# Patient Record
Sex: FEMALE | Race: WHITE | Employment: UNEMPLOYED | URBAN - METROPOLITAN AREA
[De-identification: names, ages, dates, MRNs, and addresses within clinical notes are randomized per-mention and may not be internally consistent; named-entity substitution may affect disease eponyms.]

---

## 2017-01-03 ENCOUNTER — HOSPITAL ENCOUNTER (OUTPATIENT)
Dept: PHYSICAL THERAPY | Age: 22
Discharge: HOME | End: 2017-01-03
Admitting: ORTHOPAEDIC SURGERY

## 2017-01-03 ENCOUNTER — OFFICE VISIT (OUTPATIENT)
Dept: ORTHOPEDIC SURGERY | Age: 22
End: 2017-01-03

## 2017-01-03 VITALS
HEART RATE: 77 BPM | WEIGHT: 149.03 LBS | BODY MASS INDEX: 25.44 KG/M2 | DIASTOLIC BLOOD PRESSURE: 72 MMHG | SYSTOLIC BLOOD PRESSURE: 128 MMHG | HEIGHT: 64 IN

## 2017-01-03 DIAGNOSIS — S83.241D OTHER TEAR OF MEDIAL MENISCUS OF RIGHT KNEE, UNSPECIFIED WHETHER OLD OR CURRENT TEAR, SUBSEQUENT ENCOUNTER: Primary | ICD-10-CM

## 2017-01-03 DIAGNOSIS — Z98.890 S/P ARTHROSCOPIC SURGERY OF RIGHT KNEE: ICD-10-CM

## 2017-01-03 PROCEDURE — 99024 POSTOP FOLLOW-UP VISIT: CPT | Performed by: ORTHOPAEDIC SURGERY

## 2017-01-05 ENCOUNTER — HOSPITAL ENCOUNTER (OUTPATIENT)
Dept: PHYSICAL THERAPY | Age: 22
Discharge: HOME | End: 2017-01-05
Admitting: ORTHOPAEDIC SURGERY

## 2017-01-13 ENCOUNTER — HOSPITAL ENCOUNTER (OUTPATIENT)
Dept: PHYSICAL THERAPY | Age: 22
Discharge: HOME | End: 2017-01-13
Admitting: ORTHOPAEDIC SURGERY

## 2017-02-08 ENCOUNTER — OFFICE VISIT (OUTPATIENT)
Dept: ORTHOPEDIC SURGERY | Age: 22
End: 2017-02-08

## 2017-02-08 VITALS
HEIGHT: 64 IN | BODY MASS INDEX: 24.75 KG/M2 | WEIGHT: 145 LBS | SYSTOLIC BLOOD PRESSURE: 122 MMHG | HEART RATE: 68 BPM | DIASTOLIC BLOOD PRESSURE: 64 MMHG

## 2017-02-08 DIAGNOSIS — S83.231D COMPLEX TEAR OF MEDIAL MENISCUS OF RIGHT KNEE AS CURRENT INJURY, SUBSEQUENT ENCOUNTER: Primary | ICD-10-CM

## 2017-02-08 DIAGNOSIS — Z98.890 STATUS POST MEDIAL MENISCUS REPAIR: ICD-10-CM

## 2017-02-08 PROCEDURE — 99213 OFFICE O/P EST LOW 20 MIN: CPT | Performed by: ORTHOPAEDIC SURGERY

## 2017-04-05 ENCOUNTER — OFFICE VISIT (OUTPATIENT)
Dept: ORTHOPEDIC SURGERY | Age: 22
End: 2017-04-05

## 2017-04-05 VITALS — HEART RATE: 81 BPM | SYSTOLIC BLOOD PRESSURE: 121 MMHG | DIASTOLIC BLOOD PRESSURE: 75 MMHG

## 2017-04-05 DIAGNOSIS — S83.241D OTHER TEAR OF MEDIAL MENISCUS OF RIGHT KNEE, UNSPECIFIED WHETHER OLD OR CURRENT TEAR, SUBSEQUENT ENCOUNTER: Primary | ICD-10-CM

## 2017-04-05 PROCEDURE — 99213 OFFICE O/P EST LOW 20 MIN: CPT | Performed by: ORTHOPAEDIC SURGERY

## 2017-04-06 ENCOUNTER — HOSPITAL ENCOUNTER (OUTPATIENT)
Dept: PHYSICAL THERAPY | Age: 22
Discharge: HOME | End: 2017-04-06
Admitting: ORTHOPAEDIC SURGERY

## 2017-05-09 ENCOUNTER — HOSPITAL ENCOUNTER (OUTPATIENT)
Dept: PHYSICAL THERAPY | Age: 22
Discharge: HOME | End: 2017-05-09
Admitting: ORTHOPAEDIC SURGERY

## 2017-05-18 ENCOUNTER — HOSPITAL ENCOUNTER (OUTPATIENT)
Dept: PHYSICAL THERAPY | Age: 22
Discharge: HOME | End: 2017-05-18
Admitting: ORTHOPAEDIC SURGERY

## 2017-05-25 ENCOUNTER — HOSPITAL ENCOUNTER (OUTPATIENT)
Dept: PHYSICAL THERAPY | Age: 22
Discharge: HOME | End: 2017-05-25
Admitting: ORTHOPAEDIC SURGERY

## 2017-05-30 ENCOUNTER — HOSPITAL ENCOUNTER (OUTPATIENT)
Dept: PHYSICAL THERAPY | Age: 22
Discharge: HOME | End: 2017-05-30
Admitting: ORTHOPAEDIC SURGERY

## 2017-06-08 ENCOUNTER — HOSPITAL ENCOUNTER (OUTPATIENT)
Dept: PHYSICAL THERAPY | Age: 22
Discharge: HOME | End: 2017-06-08
Admitting: ORTHOPAEDIC SURGERY

## 2017-06-13 ENCOUNTER — HOSPITAL ENCOUNTER (OUTPATIENT)
Dept: PHYSICAL THERAPY | Age: 22
Discharge: HOME | End: 2017-06-13
Admitting: ORTHOPAEDIC SURGERY

## 2017-06-15 ENCOUNTER — HOSPITAL ENCOUNTER (OUTPATIENT)
Dept: PHYSICAL THERAPY | Age: 22
Discharge: HOME | End: 2017-06-15
Admitting: ORTHOPAEDIC SURGERY

## 2017-06-27 ENCOUNTER — OFFICE VISIT (OUTPATIENT)
Dept: ORTHOPEDIC SURGERY | Age: 22
End: 2017-06-27

## 2017-06-27 ENCOUNTER — HOSPITAL ENCOUNTER (OUTPATIENT)
Dept: PHYSICAL THERAPY | Age: 22
Discharge: HOME | End: 2017-06-27
Admitting: ORTHOPAEDIC SURGERY

## 2017-06-27 VITALS
SYSTOLIC BLOOD PRESSURE: 119 MMHG | BODY MASS INDEX: 21.03 KG/M2 | HEART RATE: 90 BPM | WEIGHT: 142 LBS | HEIGHT: 69 IN | DIASTOLIC BLOOD PRESSURE: 73 MMHG

## 2017-06-27 DIAGNOSIS — S83.241D OTHER TEAR OF MEDIAL MENISCUS OF RIGHT KNEE, UNSPECIFIED WHETHER OLD OR CURRENT TEAR, SUBSEQUENT ENCOUNTER: Primary | ICD-10-CM

## 2017-06-27 PROCEDURE — 99213 OFFICE O/P EST LOW 20 MIN: CPT | Performed by: ORTHOPAEDIC SURGERY

## 2018-09-12 ENCOUNTER — OFFICE VISIT (OUTPATIENT)
Dept: ORTHOPEDIC SURGERY | Age: 23
End: 2018-09-12

## 2018-09-12 VITALS
WEIGHT: 145 LBS | HEART RATE: 66 BPM | DIASTOLIC BLOOD PRESSURE: 65 MMHG | BODY MASS INDEX: 24.75 KG/M2 | HEIGHT: 64 IN | SYSTOLIC BLOOD PRESSURE: 111 MMHG

## 2018-09-12 DIAGNOSIS — S83.241A TEAR OF MEDIAL MENISCUS OF RIGHT KNEE, CURRENT, UNSPECIFIED TEAR TYPE, INITIAL ENCOUNTER: Primary | ICD-10-CM

## 2018-09-12 DIAGNOSIS — M25.561 RIGHT KNEE PAIN, UNSPECIFIED CHRONICITY: ICD-10-CM

## 2018-09-12 DIAGNOSIS — S83.411A SPRAIN OF MEDIAL COLLATERAL LIGAMENT OF RIGHT KNEE, INITIAL ENCOUNTER: ICD-10-CM

## 2018-09-12 PROCEDURE — 99214 OFFICE O/P EST MOD 30 MIN: CPT | Performed by: ORTHOPAEDIC SURGERY

## 2018-09-12 NOTE — LETTER
[facility]  Surgery Precert & Billing Form:    DEMOGRAPHICS:                                                                                                       Patient Name:  Ugo Lanier  Patient :  1995   Patient SS#:      Patient Phone:  [phone] (home) [phone] (work) Alt.  Patient Phone:    Patient Address:  [address] [place] [address]    PCP:  Connie Zapata MD  Insurance: [employer]    DIAGNOSIS & PROCEDURE:                                                                                      Diagnosis: MMT  Operation: right    SURGERY  INFORMATION  Date of Surgery:   2018  Location:    [facility]  Type:    Outpatient  23 hour hold:  No  Surgeon:          Mansi Springer MD         18     BILLING INFORMATION:                                                                                                Physician Procedure                                            CPT Codes                      [place], or Fellow Procedure                                      CPT Codes                      Precert information:

## 2018-09-12 NOTE — LETTER
[place] [address]  Phone: [phone]  Fax: [phone]    Kandace Vasquez MD        September 12, 2018     Patient: Smitha De La Rosa   YOB: 1995   Date of Visit: 9/12/2018       To Whom It May Concern: It is my medical opinion that Smitha De La Rosa requires a disability parking placard for the following reasons:  She cannot walk 200 feet without stopping to rest.  Duration of need: 6 months    If you have any questions or concerns, please don't hesitate to call.     Sincerely,        Kandace Vasquez MD

## 2018-09-12 NOTE — PROGRESS NOTES
Patient comes to the office for an evaluation of medial sided right knee pain. She reports that Friday she felt a pop in her knee and was seen by a physician who then diagnosed her with a medial meniscus tear and cleared her to play soccer. She notes that yesterday she was playing soccer and her knee locked up on her. Patient states that she had a hard time trying to straighten her knee and trainers tried moving her knee around to try and loosen it. She reports swelling. She is currently using crutches and a knee immobilizer. Outside mri films and report reviewed in the office today. X-rays taken in the office and shows no evidence of a fracture. Of note, patient is 1.5 years following a medial meniscus repair and synovectomy. She rates her pain 5/10 today.      - medial mensicus tear / mcl sprain, right knee     - right knee scope with medial menisectomy

## 2018-09-13 ENCOUNTER — TELEPHONE (OUTPATIENT)
Dept: ORTHOPEDIC SURGERY | Age: 23
End: 2018-09-13

## 2018-09-14 NOTE — PROGRESS NOTES
Chief complaint is right knee pain. The patient is a 25-year-old college  who underwent an ACL reconstruction 3 years ago and a medial meniscus repair a year and a half ago. Last week she was playing and she reinjured her knee and was subsequently diagnosed as having a torn meniscus is allowed to continue playing and develop a locking episode which she says it took several minutes for her athletic trainers were able to straighten her knee out. She comes in today for evaluation. He complains of pain over the medial aspect of her knee. She is using crutches and a knee immobilizer. She says her knee is stiff and tight she has difficulty bending. Pain Assessment  Location of Pain: Knee  Location Modifiers: Right  Quality of Pain: Sharp  Duration of Pain: Persistent  Frequency of Pain: Constant  Aggravating Factors:  (extension and flexion)  Limiting Behavior: Yes  Result of Injury: Yes  Work-Related Injury: No  Are there other pain locations you wish to document?: No    No past medical history on file. Past Surgical History:   Procedure Laterality Date    ANTERIOR CRUCIATE LIGAMENT REPAIR Right 08/05/2015    ARM SURGERY      KNEE ARTHROSCOPY Right 11/09/2016    right knee scope, medial meniscus repair and synovectomy    KNEE ARTHROSCOPY  08/05/2015    acl repair        No family history on file.     Social History     Social History    Marital status: Single     Spouse name: N/A    Number of children: N/A    Years of education: N/A     Social History Main Topics    Smoking status: Never Smoker    Smokeless tobacco: Never Used    Alcohol use 0.0 oz/week    Drug use: No    Sexual activity: Not Asked     Other Topics Concern    None     Social History Narrative    None       Current Outpatient Prescriptions   Medication Sig Dispense Refill    azithromycin (ZITHROMAX) 250 MG tablet       oxyCODONE-acetaminophen (PERCOCET) 5-325 MG per tablet       VESTURA 3-0.02 MG per tablet end of season. Greater than 50% of the visit was spent counseling the patient. I personally reviewed the patient's pain scale, review of systems, family history, social history, past medical history, allergies and medications. 13 point review of systems was collected today and is included in the medical record. Duke Wallace MD  Sports Medicine, Knee and Shoulder Surgery    This dictation was performed with a verbal recognition program [employer]) and it was checked for errors. It is possible that there are still dictated errors within this office note. If so, please bring any errors to my attention for an addendum. All efforts were made to ensure that this office note is accurate.

## 2018-09-18 RX ORDER — DROSPIRENONE AND ETHINYL ESTRADIOL 0.02-3(28)
1 KIT ORAL DAILY
COMMUNITY

## 2018-09-19 ENCOUNTER — ANESTHESIA EVENT (OUTPATIENT)
Dept: OPERATING ROOM | Age: 23
End: 2018-09-19
Payer: COMMERCIAL

## 2018-09-19 RX ORDER — PROMETHAZINE HYDROCHLORIDE 25 MG/1
25 TABLET ORAL EVERY 6 HOURS PRN
Qty: 20 TABLET | Refills: 0 | Status: SHIPPED | OUTPATIENT
Start: 2018-09-19 | End: 2018-09-26

## 2018-09-20 ENCOUNTER — HOSPITAL ENCOUNTER (OUTPATIENT)
Age: 23
Setting detail: OUTPATIENT SURGERY
Discharge: HOME | End: 2018-09-20
Attending: ORTHOPAEDIC SURGERY | Admitting: ORTHOPAEDIC SURGERY
Payer: COMMERCIAL

## 2018-09-20 ENCOUNTER — ANESTHESIA (OUTPATIENT)
Dept: OPERATING ROOM | Age: 23
End: 2018-09-20
Payer: COMMERCIAL

## 2018-09-20 VITALS
DIASTOLIC BLOOD PRESSURE: 83 MMHG | TEMPERATURE: 98 F | OXYGEN SATURATION: 100 % | BODY MASS INDEX: 24.16 KG/M2 | RESPIRATION RATE: 14 BRPM | WEIGHT: 145 LBS | HEIGHT: 65 IN | SYSTOLIC BLOOD PRESSURE: 137 MMHG | HEART RATE: 54 BPM

## 2018-09-20 VITALS
OXYGEN SATURATION: 74 % | SYSTOLIC BLOOD PRESSURE: 91 MMHG | DIASTOLIC BLOOD PRESSURE: 43 MMHG | RESPIRATION RATE: 18 BRPM

## 2018-09-20 LAB — PREGNANCY, URINE: NEGATIVE

## 2018-09-20 PROCEDURE — 6360000002 HC RX W HCPCS: Performed by: ORTHOPAEDIC SURGERY

## 2018-09-20 PROCEDURE — 6360000002 HC RX W HCPCS: Performed by: ANESTHESIOLOGY

## 2018-09-20 PROCEDURE — 2500000003 HC RX 250 WO HCPCS: Performed by: NURSE ANESTHETIST, CERTIFIED REGISTERED

## 2018-09-20 PROCEDURE — 3600000014 HC SURGERY LEVEL 4 ADDTL 15MIN: Performed by: ORTHOPAEDIC SURGERY

## 2018-09-20 PROCEDURE — 6360000002 HC RX W HCPCS: Performed by: NURSE ANESTHETIST, CERTIFIED REGISTERED

## 2018-09-20 PROCEDURE — 3700000000 HC ANESTHESIA ATTENDED CARE: Performed by: ORTHOPAEDIC SURGERY

## 2018-09-20 PROCEDURE — 2720000010 HC SURG SUPPLY STERILE: Performed by: ORTHOPAEDIC SURGERY

## 2018-09-20 PROCEDURE — 7100000010 HC PHASE II RECOVERY - FIRST 15 MIN: Performed by: ORTHOPAEDIC SURGERY

## 2018-09-20 PROCEDURE — 7100000001 HC PACU RECOVERY - ADDTL 15 MIN: Performed by: ORTHOPAEDIC SURGERY

## 2018-09-20 PROCEDURE — 2580000003 HC RX 258: Performed by: ANESTHESIOLOGY

## 2018-09-20 PROCEDURE — 2580000003 HC RX 258: Performed by: ORTHOPAEDIC SURGERY

## 2018-09-20 PROCEDURE — 2709999900 HC NON-CHARGEABLE SUPPLY: Performed by: ORTHOPAEDIC SURGERY

## 2018-09-20 PROCEDURE — 7100000011 HC PHASE II RECOVERY - ADDTL 15 MIN: Performed by: ORTHOPAEDIC SURGERY

## 2018-09-20 PROCEDURE — 3600000004 HC SURGERY LEVEL 4 BASE: Performed by: ORTHOPAEDIC SURGERY

## 2018-09-20 PROCEDURE — 6370000000 HC RX 637 (ALT 250 FOR IP): Performed by: ANESTHESIOLOGY

## 2018-09-20 PROCEDURE — 3700000001 HC ADD 15 MINUTES (ANESTHESIA): Performed by: ORTHOPAEDIC SURGERY

## 2018-09-20 PROCEDURE — 84703 CHORIONIC GONADOTROPIN ASSAY: CPT

## 2018-09-20 PROCEDURE — 7100000000 HC PACU RECOVERY - FIRST 15 MIN: Performed by: ORTHOPAEDIC SURGERY

## 2018-09-20 RX ORDER — SCOLOPAMINE TRANSDERMAL SYSTEM 1 MG/1
1 PATCH, EXTENDED RELEASE TRANSDERMAL
Status: DISCONTINUED | OUTPATIENT
Start: 2018-09-20 | End: 2018-09-20 | Stop reason: HOSPADM

## 2018-09-20 RX ORDER — LIDOCAINE HYDROCHLORIDE 20 MG/ML
INJECTION, SOLUTION EPIDURAL; INFILTRATION; INTRACAUDAL; PERINEURAL PRN
Status: DISCONTINUED | OUTPATIENT
Start: 2018-09-20 | End: 2018-09-20 | Stop reason: SDUPTHER

## 2018-09-20 RX ORDER — DEXAMETHASONE SODIUM PHOSPHATE 4 MG/ML
INJECTION, SOLUTION INTRA-ARTICULAR; INTRALESIONAL; INTRAMUSCULAR; INTRAVENOUS; SOFT TISSUE PRN
Status: DISCONTINUED | OUTPATIENT
Start: 2018-09-20 | End: 2018-09-20 | Stop reason: SDUPTHER

## 2018-09-20 RX ORDER — FENTANYL CITRATE 50 UG/ML
50 INJECTION, SOLUTION INTRAMUSCULAR; INTRAVENOUS EVERY 5 MIN PRN
Status: DISCONTINUED | OUTPATIENT
Start: 2018-09-20 | End: 2018-09-20 | Stop reason: HOSPADM

## 2018-09-20 RX ORDER — FENTANYL CITRATE 50 UG/ML
25 INJECTION, SOLUTION INTRAMUSCULAR; INTRAVENOUS EVERY 5 MIN PRN
Status: DISCONTINUED | OUTPATIENT
Start: 2018-09-20 | End: 2018-09-20 | Stop reason: HOSPADM

## 2018-09-20 RX ORDER — MIDAZOLAM HYDROCHLORIDE 1 MG/ML
INJECTION INTRAMUSCULAR; INTRAVENOUS PRN
Status: DISCONTINUED | OUTPATIENT
Start: 2018-09-20 | End: 2018-09-20 | Stop reason: SDUPTHER

## 2018-09-20 RX ORDER — ROPIVACAINE HYDROCHLORIDE 5 MG/ML
INJECTION, SOLUTION EPIDURAL; INFILTRATION; PERINEURAL PRN
Status: DISCONTINUED | OUTPATIENT
Start: 2018-09-20 | End: 2018-09-20 | Stop reason: HOSPADM

## 2018-09-20 RX ORDER — AMOXICILLIN 250 MG
2 CAPSULE ORAL DAILY PRN
Qty: 30 TABLET | Refills: 0 | COMMUNITY
Start: 2018-09-20

## 2018-09-20 RX ORDER — PROPOFOL 10 MG/ML
INJECTION, EMULSION INTRAVENOUS PRN
Status: DISCONTINUED | OUTPATIENT
Start: 2018-09-20 | End: 2018-09-20 | Stop reason: SDUPTHER

## 2018-09-20 RX ORDER — PROMETHAZINE HYDROCHLORIDE 25 MG/ML
6.25 INJECTION, SOLUTION INTRAMUSCULAR; INTRAVENOUS
Status: COMPLETED | OUTPATIENT
Start: 2018-09-20 | End: 2018-09-20

## 2018-09-20 RX ORDER — SODIUM CHLORIDE, SODIUM LACTATE, POTASSIUM CHLORIDE, CALCIUM CHLORIDE 600; 310; 30; 20 MG/100ML; MG/100ML; MG/100ML; MG/100ML
INJECTION, SOLUTION INTRAVENOUS CONTINUOUS
Status: DISCONTINUED | OUTPATIENT
Start: 2018-09-20 | End: 2018-09-20 | Stop reason: HOSPADM

## 2018-09-20 RX ORDER — KETOROLAC TROMETHAMINE 30 MG/ML
INJECTION, SOLUTION INTRAMUSCULAR; INTRAVENOUS
Status: DISCONTINUED
Start: 2018-09-20 | End: 2018-09-20 | Stop reason: HOSPADM

## 2018-09-20 RX ORDER — KETOROLAC TROMETHAMINE 30 MG/ML
30 INJECTION, SOLUTION INTRAMUSCULAR; INTRAVENOUS ONCE
Status: COMPLETED | OUTPATIENT
Start: 2018-09-20 | End: 2018-09-20

## 2018-09-20 RX ORDER — ONDANSETRON 2 MG/ML
4 INJECTION INTRAMUSCULAR; INTRAVENOUS
Status: COMPLETED | OUTPATIENT
Start: 2018-09-20 | End: 2018-09-20

## 2018-09-20 RX ORDER — SODIUM CHLORIDE, SODIUM LACTATE, POTASSIUM CHLORIDE, AND CALCIUM CHLORIDE .6; .31; .03; .02 G/100ML; G/100ML; G/100ML; G/100ML
IRRIGANT IRRIGATION PRN
Status: DISCONTINUED | OUTPATIENT
Start: 2018-09-20 | End: 2018-09-20 | Stop reason: HOSPADM

## 2018-09-20 RX ORDER — SCOLOPAMINE TRANSDERMAL SYSTEM 1 MG/1
PATCH, EXTENDED RELEASE TRANSDERMAL
Status: DISCONTINUED
Start: 2018-09-20 | End: 2018-09-20 | Stop reason: HOSPADM

## 2018-09-20 RX ORDER — OXYCODONE HYDROCHLORIDE AND ACETAMINOPHEN 5; 325 MG/1; MG/1
1 TABLET ORAL EVERY 4 HOURS PRN
Status: DISCONTINUED | OUTPATIENT
Start: 2018-09-20 | End: 2018-09-20 | Stop reason: HOSPADM

## 2018-09-20 RX ORDER — FENTANYL CITRATE 50 UG/ML
INJECTION, SOLUTION INTRAMUSCULAR; INTRAVENOUS PRN
Status: DISCONTINUED | OUTPATIENT
Start: 2018-09-20 | End: 2018-09-20 | Stop reason: SDUPTHER

## 2018-09-20 RX ADMIN — SODIUM CHLORIDE, SODIUM LACTATE, POTASSIUM CHLORIDE, AND CALCIUM CHLORIDE: 600; 310; 30; 20 INJECTION, SOLUTION INTRAVENOUS at 08:35

## 2018-09-20 RX ADMIN — KETOROLAC TROMETHAMINE 30 MG: 30 INJECTION, SOLUTION INTRAMUSCULAR at 09:50

## 2018-09-20 RX ADMIN — SODIUM CHLORIDE, SODIUM LACTATE, POTASSIUM CHLORIDE, AND CALCIUM CHLORIDE: 600; 310; 30; 20 INJECTION, SOLUTION INTRAVENOUS at 07:30

## 2018-09-20 RX ADMIN — PROPOFOL 220 MG: 10 INJECTION, EMULSION INTRAVENOUS at 07:41

## 2018-09-20 RX ADMIN — ONDANSETRON 4 MG: 2 INJECTION INTRAMUSCULAR; INTRAVENOUS at 08:30

## 2018-09-20 RX ADMIN — Medication 2 G: at 07:51

## 2018-09-20 RX ADMIN — FENTANYL CITRATE 50 MCG: 50 INJECTION INTRAMUSCULAR; INTRAVENOUS at 08:09

## 2018-09-20 RX ADMIN — HYDROMORPHONE HYDROCHLORIDE 0.25 MG: 1 INJECTION, SOLUTION INTRAMUSCULAR; INTRAVENOUS; SUBCUTANEOUS at 09:10

## 2018-09-20 RX ADMIN — PROMETHAZINE HYDROCHLORIDE 6.25 MG: 25 INJECTION INTRAMUSCULAR; INTRAVENOUS at 09:11

## 2018-09-20 RX ADMIN — HYDROMORPHONE HYDROCHLORIDE 0.25 MG: 1 INJECTION, SOLUTION INTRAMUSCULAR; INTRAVENOUS; SUBCUTANEOUS at 09:44

## 2018-09-20 RX ADMIN — MIDAZOLAM HYDROCHLORIDE 2 MG: 1 INJECTION INTRAMUSCULAR; INTRAVENOUS at 07:30

## 2018-09-20 RX ADMIN — LIDOCAINE HYDROCHLORIDE 60 MG: 20 INJECTION, SOLUTION EPIDURAL; INFILTRATION; INTRACAUDAL; PERINEURAL at 07:41

## 2018-09-20 RX ADMIN — DEXAMETHASONE SODIUM PHOSPHATE 4 MG: 4 INJECTION, SOLUTION INTRAMUSCULAR; INTRAVENOUS at 08:30

## 2018-09-20 ASSESSMENT — PULMONARY FUNCTION TESTS
PIF_VALUE: 9
PIF_VALUE: 8
PIF_VALUE: 9
PIF_VALUE: 0
PIF_VALUE: 9
PIF_VALUE: 1
PIF_VALUE: 9
PIF_VALUE: 8
PIF_VALUE: 1
PIF_VALUE: 9
PIF_VALUE: 1
PIF_VALUE: 9
PIF_VALUE: 1
PIF_VALUE: 9
PIF_VALUE: 1
PIF_VALUE: 0
PIF_VALUE: 2
PIF_VALUE: 9

## 2018-09-20 ASSESSMENT — PAIN DESCRIPTION - DESCRIPTORS
DESCRIPTORS: ACHING

## 2018-09-20 ASSESSMENT — PAIN DESCRIPTION - ORIENTATION
ORIENTATION: RIGHT

## 2018-09-20 ASSESSMENT — PAIN DESCRIPTION - PAIN TYPE
TYPE: SURGICAL PAIN

## 2018-09-20 ASSESSMENT — PAIN DESCRIPTION - LOCATION
LOCATION: KNEE
LOCATION: HIP;KNEE
LOCATION: KNEE

## 2018-09-20 ASSESSMENT — PAIN SCALES - GENERAL
PAINLEVEL_OUTOF10: 5
PAINLEVEL_OUTOF10: 4
PAINLEVEL_OUTOF10: 4
PAINLEVEL_OUTOF10: 5
PAINLEVEL_OUTOF10: 4

## 2018-09-20 ASSESSMENT — PAIN DESCRIPTION - FREQUENCY: FREQUENCY: CONTINUOUS

## 2018-09-20 ASSESSMENT — PAIN - FUNCTIONAL ASSESSMENT: PAIN_FUNCTIONAL_ASSESSMENT: 0-10

## 2018-09-20 NOTE — PROGRESS NOTES
Ambulatory Surgery/Procedure Discharge Note    Vitals:    09/20/18 1006   BP: 137/83   Pulse: 54   Resp: 14   Temp: 98 °F (36.7 °C)   SpO2: 100%       In: 1521 [I.V.:1521]  Out: 15     Pain assessment:  {Pain assessment:  Pain Level: 4      Arrived in SDS will speak if spoken to. No nausea. Sleepy. Parents with her. Encouraged to sleep. Clean right knee dressing with fresh ice bags. Right dorsalis palp. Toes are pink with instant refill. Moving toes and denies numbness or tingling in foot. Parents are stepping out to let her sleep for 45 min more then to try to DC. Because of dizziness and history of severe NV. Parents to obtain RX from pharmacy. 1120 Awakened and she asked to void. Up to BR . Steady when up. No nausea and no pain now. Family called for. Discharge instructions reviewed. Patient and parents educated, using the teach back method, about follow up instructions and discharge instructions. A completed copy of the AVS instructions given to patient and all questions answered. Patient discharged to home/self care.  Patient discharged via wheel chair by RN transporter to waiting family/S.O.       9/20/2018 10:09 AM
CLINICAL PHARMACY NOTE: MEDS TO [address] Select Patient?: No  Total # of Prescriptions Filled: 1   The following medications were delivered to the patient:  · Percocet  Total # of Interventions Completed: 0  Time Spent (min): 15    Additional Documentation:
Color was pale on arrival.now is no longer pale
PACU Transfer to [facility]    Vitals:    09/20/18 0950   BP: 134/78   Pulse: 61   Resp: 15   Temp: 97.9 °F (36.6 °C)   SpO2: 99%         Intake/Output Summary (Last 24 hours) at 09/20/18 0956  Last data filed at 09/20/18 0840   Gross per 24 hour   Intake             1000 ml   Output               15 ml   Net              985 ml       Pain assessment:  Pt had nausea last surgery 2 years ago, scope patch behind right ear, phenergan given in pacu. Pt eating a few crackers and sips of soda. Nausea improved after phenergan. Toradol given in pacu, pain level 4, \"tolerable\" to pt.   Father previously updated  Pain Level: 4    Patient transferred to care of BRENDON RN.    9/20/2018 9:56 AM
Pt arrived from OR, s/p RIGHT KNEE ARTHROSCOPY, MEDIAL MENISCUS EXCISION, SYNOVECTOMY (Right ), oral airway removed on arrival.  Scope patch behind right ear
Pt c/o slight nausea, treated with IV phenergan
you have not been preregistered yet. On the day of your procedure bring your insurance card and photo ID. You will be registered at your bedside once brought back to your room. 5. DO NOT EAT OR DRINK ANYTHING AFTER MIDNIGHT. 6. MEDICATIONS    Take the following medications with a SMALL sip of water:    Use your usual dose of inhalers the morning of surgery. BRING your rescue inhaler with you to hospital.    Anesthesia does NOT want you to take insulin the morning of surgery. They will control your blood sugar while you are at the hospital. Please contact your ordering physician for instructions regarding your insulin the night before your procedure. If you have an insulin pump, please keep it set on basal rate. 7. Do not swallow water when brushing teeth. No gum, candy, mints or ice chips. Refrain from smoking or at least decrease the amount. 8. Dress in loose, comfortable clothing appropriate for redressing after your procedure. Do not wear jewelry (including body piercings), make-up (especially NO eye make-up), fingernail polish (NO toenail polish if foot/leg surgery), lotion, powders or metal hairclips. 9. Dentures, glasses, or contacts will need to be removed before your procedure. Bring cases for your glasses, contacts, dentures, or hearing aids to protect them while you are in surgery. 10. If you use a CPAP, please bring it with you on the day of your procedure. 11. We recommend that valuable personal  belongings, such as credit cards, cash, cell phones, e-tablets or jewelry, be left at home during your stay. The hospital will not be responsible for valuables that are not secured in the hospital safe. However, if your insurance requires a co-pay, you may want to bring a method of payment, i.e. Check or credit card, if you wish to pay your co-pay the day of surgery. 12. If you are to stay overnight, you may bring a bag with personal items.  Please have any large items you may

## 2018-09-20 NOTE — ANESTHESIA POSTPROCEDURE EVALUATION
Department of Anesthesiology  Postprocedure Note    Patient: Gerry Barr  MRN: [id number]  [place]: 1995  Date of evaluation: 9/20/2018  Time:  1:05 PM     Procedure Summary     Date:  09/20/18 Room / Location:  [facility] OR [address] [place] [address] OR    Anesthesia Start:  0730 Anesthesia Stop:  6138    Procedure:  RIGHT KNEE ARTHROSCOPY, MEDIAL MENISCUS EXCISION, SYNOVECTOMY (Right ) Diagnosis:  (MEDIAL MENSICUS TEAR RIGHT KNEE)    Surgeon:  Katie Schulz MD Responsible Provider:  Joanna Liz MD    Anesthesia Type:  general ASA Status:  1          Anesthesia Type: general    Tarik Phase I: Tarik Score: 10    Tarik Phase II: Tarik Score: 9    Last vitals: Reviewed and per EMR flowsheets.        Anesthesia Post Evaluation    Patient location during evaluation: PACU  Patient participation: complete - patient participated  Level of consciousness: awake and alert  Airway patency: patent  Nausea & Vomiting: no nausea and no vomiting  Complications: no  Cardiovascular status: blood pressure returned to baseline  Respiratory status: acceptable  Hydration status: stable

## 2018-09-20 NOTE — ANESTHESIA PRE PROCEDURE
Department of Anesthesiology  Preprocedure Note       Name:  Jay Ford   Age:  25 y.o.  :  1995                                          MRN:  [id number]         Date:  2018      Surgeon: Gregorio Clark): Everett Garcia MD    Procedure: Procedure(s):  RIGHT KNEE ARTHROSCOPY, MEDIAL MENISCUS EXCISION VS REPAIR    Medications prior to admission:   Prior to Admission medications    Medication Sig Start Date End Date Taking?  Authorizing Provider   drospirenone-ethinyl estradiol (LORYNA) 3-0.02 MG per tablet Take 1 tablet by mouth daily   Yes Historical Provider, MD   promethazine (PHENERGAN) 25 MG tablet Take 1 tablet by mouth every 6 hours as needed for Nausea 18  Everett Garcia MD   [facility]) 250 MG tablet  11/15/15   Historical Provider, MD   oxyCODONE-acetaminophen (PERCOCET) 5-325 MG per tablet  8/5/15   Historical Provider, MD       Current medications:    Current Facility-Administered Medications   Medication Dose Route Frequency Provider Last Rate Last Dose    ceFAZolin (ANCEF) 2 g in dextrose 5 % 50 mL IVPB  2 g Intravenous Once Everett Garcia MD        lactated ringers infusion   Intravenous Continuous Natacha Lord MD        scopolamine (TRANSDERM-SCOP) transdermal patch 1 patch  1 patch Transdermal Q72H Marysol Peng MD           Allergies:  No Known Allergies    Problem List:    Patient Active Problem List   Diagnosis Code    ACL tear, right S83.519A    Medial meniscus tear, right S83.249A    Knee MCL sprain, right S83.419A    Acne L70.9    Tear of lateral cartilage or meniscus of knee, current S83.289A    Lateral meniscus tear S83.289A    S/P ACL repair A61.326    S/P arthroscopic surgery of right knee Z98.890    Complex tear of medial meniscus of right knee as current injury S80.80A    S/P knee surgery Z98.890    Status post medial meniscus repair P55.022       Past Medical History:        Diagnosis Date    PONV (postoperative nausea

## 2018-09-20 NOTE — H&P
Angelita Montanez    [id number]    [facility] Same Day Surgery Update H & P  Department of General Surgery   Surgical Service   CNP Pre-operative History and Physical  Last H & P within the last 30 days. DIAGNOSIS:   MEDIAL MENSICUS TEAR RIGHT KNEE    PROCEDURE:  Right Knee Arthroscopy, Medial Meniscus Excision Vs Repair    HISTORY OF PRESENT ILLNESS:  A 25year old female patient with history of right knee pain, stiffness, limited ROM, inability to achieve and maintain FWB status. Symptoms not relieved by conservative treatment. Patient here today for surgical intervention. Please see initial H & P     Past Medical History:        Diagnosis Date    PONV (postoperative nausea and vomiting)      Past Surgical History:        Procedure Laterality Date    ANTERIOR CRUCIATE LIGAMENT REPAIR Right 08/05/2015    ARM SURGERY      KNEE ARTHROSCOPY Right 11/09/2016    right knee scope, medial meniscus repair and synovectomy    KNEE ARTHROSCOPY  08/05/2015    acl repair      Past Social History:  Social History     Social History    Marital status: Single     Spouse name: N/A    Number of children: N/A    Years of education: N/A     Social History Main Topics    Smoking status: Never Smoker    Smokeless tobacco: Never Used    Alcohol use 0.0 oz/week      Comment: occasional    Drug use: No    Sexual activity: Not Asked     Other Topics Concern    None     Social History Narrative    None         Medications Prior to Admission:      Prior to Admission medications    Medication Sig Start Date End Date Taking?  Authorizing Provider   drospirenone-ethinyl estradiol (LORYNA) 3-0.02 MG per tablet Take 1 tablet by mouth daily   Yes Historical Provider, MD   promethazine (PHENERGAN) 25 MG tablet Take 1 tablet by mouth every 6 hours as needed for Nausea 9/19/18 9/26/18  Ramon Ackerman MD   azithromycin (ZITHROMAX) 250 MG tablet  11/15/15   Historical Provider, MD   oxyCODONE-acetaminophen (PERCOCET) 5-325 MG per tablet  8/5/15   Historical Provider, MD         Allergies:  Patient has no known allergies. PHYSICAL EXAM:      /72   Pulse 76   Temp 98 °F (36.7 °C) (Oral)   Resp 16   Ht 5' 5\" (1.651 m)   Wt 145 lb (65.8 kg)   LMP 09/13/2018 (Exact Date)   SpO2 97%   BMI 24.13 kg/m²      Heart:  regular rate and rhythm, no murmur noted on exam    Lungs:  No increased work of breathing, good air exchange, clear to auscultation bilaterally, no crackles or wheezing    Abdomen:  soft, non-distended, non-tender, no rebound tenderness or guarding, normal active bowel sounds, no masses palpated and no hepatosplenomegaly    ASSESSMENT AND PLAN:    1. Patient seen and focused exam done today- no new changes since last physical exam on 9/17/2018    2. Access to ancillary services are available per request of the provider.     JEFF Henriquez - CNP     9/20/2018

## 2018-09-20 NOTE — OP NOTE
Knee was visualized sequentially. Articular cartilage surfaces showed evidence of some very mild fraying of  the medial facet of the patella as well as a central trochlea, and  chondroplasty of this area was carried out in which the articular cartilage  was smoothed down to a stable base of tissue. Reactive synovitis was  excised medially, laterally and anteriorly. A three-compartment  synovectomy was carried out using synovial shaver. The lateral meniscus  appeared normal.  ACL graft was intact. The patient did have a displaced  bucket-handle medial meniscus tear and the meniscus appeared quite  degenerative. It was not amenable to repair. Partial medial meniscectomy  was carried out by first detaching the posterior aspect of the meniscus,  flipping the fragment into the anterior aspect of the knee and then  excising it back to stable base of tissue. This resulted in a near  complete meniscectomy involving the posterior horn of the medial meniscus. Anterior horn was left intact, mid body about 50% resected. Articular  cartilage surfaces appeared normal in the medial compartment. No loose  bodies were identified. Meniscal tibial recess was evaluated. No loose  bodies were present or displaced fragments were present. The scope was  removed from the joint. Incision was closed with Monocryl and dressing was  applied. The patient was awakened and taken to recovery room in stable  condition.   The sponge and needle counts were correct at the conclusion of  procedure and blood loss was minimal.        Ghada Jones MD    D: 09/20/2018 8:45:40       T: 09/20/2018 8:47:14     [email or web address]  Job#: [id number]     Doc#: [id number]    CC:

## 2018-09-21 ENCOUNTER — OFFICE VISIT (OUTPATIENT)
Dept: ORTHOPEDIC SURGERY | Age: 23
End: 2018-09-21

## 2018-09-21 ENCOUNTER — HOSPITAL ENCOUNTER (OUTPATIENT)
Dept: PHYSICAL THERAPY | Age: 23
Setting detail: THERAPIES SERIES
Discharge: HOME | End: 2018-09-21
Payer: COMMERCIAL

## 2018-09-21 VITALS
SYSTOLIC BLOOD PRESSURE: 113 MMHG | BODY MASS INDEX: 24.16 KG/M2 | HEART RATE: 69 BPM | HEIGHT: 65 IN | DIASTOLIC BLOOD PRESSURE: 73 MMHG | WEIGHT: 145 LBS

## 2018-09-21 DIAGNOSIS — M65.9 SYNOVITIS: ICD-10-CM

## 2018-09-21 DIAGNOSIS — S83.241D TEAR OF MEDIAL MENISCUS OF RIGHT KNEE, CURRENT, UNSPECIFIED TEAR TYPE, SUBSEQUENT ENCOUNTER: Primary | ICD-10-CM

## 2018-09-21 DIAGNOSIS — Z98.890 S/P MEDIAL MENISCECTOMY OF RIGHT KNEE: ICD-10-CM

## 2018-09-21 PROCEDURE — G8979 MOBILITY GOAL STATUS: HCPCS

## 2018-09-21 PROCEDURE — 97110 THERAPEUTIC EXERCISES: CPT

## 2018-09-21 PROCEDURE — 97016 VASOPNEUMATIC DEVICE THERAPY: CPT

## 2018-09-21 PROCEDURE — 99024 POSTOP FOLLOW-UP VISIT: CPT | Performed by: ORTHOPAEDIC SURGERY

## 2018-09-21 PROCEDURE — G8978 MOBILITY CURRENT STATUS: HCPCS

## 2018-09-21 PROCEDURE — 97161 PT EVAL LOW COMPLEX 20 MIN: CPT

## 2018-09-21 NOTE — LETTER
Weight Bearing: [] Non  [] 1/4  [] 1/2  [] 3/4  [] Full  ROM: [] Restricted  [] Full  [] Follow established:        [] Other:

## 2018-09-21 NOTE — PROGRESS NOTES
Patient comes in the office 1 day following a right partial medial menisectomy, chondroplasty and synovectomy. She reports that she is doing well and is not having any issues. She states that she is managing her pain well and was able to sleep ok.      - doing well postoperatively    - begin rehab and follow up 2 weeks
tablet       oxyCODONE-acetaminophen (PERCOCET) 5-325 MG per tablet        No current facility-administered medications for this visit. No Known Allergies    Vital signs:  /73   Pulse 69   Ht 5' 5\" (1.651 m)   Wt 145 lb (65.8 kg)   LMP 09/13/2018 (Exact Date)   BMI 24.13 kg/m²        Neuro: Alert & oriented x 3,  normal,  no focal deficits noted. Normal affect. Eyes: sclera clear  Ears: Normal external ear  Mouth:  No perioral lesions  Pulm: Respirations unlabored and regular  Pulse: Regular rate and rhythm   Skin: Warm, well perfused          Knee exam benign. Minimal effusion. Calf soft nontender. Impression: Stable postop. Begin rehab with follow-up in 2 weeks. Greater than 50% of the visit was spent counseling the patient. I personally reviewed the patient's pain scale, review of systems, family history, social history, past medical history, allergies and medications. 13 point review of systems was collected today and is included in the medical record. Khai Doss MD  Sports Medicine, Knee and Shoulder Surgery    This dictation was performed with a verbal recognition program [facility]) and it was checked for errors. It is possible that there are still dictated errors within this office note. If so, please bring any errors to my attention for an addendum. All efforts were made to ensure that this office note is accurate.

## 2018-09-21 NOTE — PLAN OF CARE
The [address] [place] [address]  Phone [phone]  Fax [phone]                                                       Physical Therapy Certification    Dear Referring Practitioner: Shakeel Oneal,    We had the pleasure of evaluating the following patient for physical therapy services at [address] [place] [address]. A summary of our findings can be found in the initial assessment below. This includes our plan of care. If you have any questions or concerns regarding these findings, please do not hesitate to contact me at the office phone number checked above. Thank you for the referral.       Physician Signature:_______________________________Date:__________________  By signing above (or electronic signature), therapists plan is approved by physician      Patient: John Morocho   : 1995   MRN: [id number]  Referring Physician: Referring Practitioner: Shakeel Oneal      Evaluation Date: 2018      Medical Diagnosis Information:  Diagnosis: P23.085L (ICD-10-CM) - Tear of medial meniscus of right knee, current, unspecified tear type, subsequent encounter Z98.890 (ICD-10-CM) - S/P medial meniscectomy of right knee M65.9 (ICD-10-CM) - Synovitis   Treatment Diagnosis: L knee pain, impaired ROM, impaired strength, impaired gait                                         Insurance information: PT Insurance Information: [facility] [place] - 40 visits per year     Precautions/ Contra-indications: none  Latex Allergy:  [x]NO      []YES  Preferred Language for Healthcare:   [x]English       []other:    SUBJECTIVE: Patient stated complaint: Pt is a 22 yof 1 day s/p Right knee arthroscopy with partial medial meniscectomy, patellar chondroplasty and synovectomy (DOS18). 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 She has hx of R ACLR and R medial meniscus repair.  Was previously indep with all mobility and ADLs. Goal is to get back to PLOF of playing college soccer. Pt electing to return to school and work with team PT. Relevant Medical History:R ACLR 15', R medial meniscus repair 16'  Functional Disability Index:PT G-Codes  Functional Assessment Tool Used: LEFS  Score: 33.75% impaired  Functional Limitation: Mobility: Walking and moving around  Mobility: Walking and Moving Around Current Status ():  At least 20 percent but less than 40 percent impaired, limited or restricted  Mobility: Walking and Moving Around Goal Status (): 0 percent impaired, limited or restricted    Pain Scale: 0/10  Easing factors: rest, ice  Provocative factors: weight bearing, movement     Type: []Constant   [x]Intermittent  []Radiating []Localized []other:     Numbness/Tingling: denies     Occupation/School: [employer]     Living Status/Prior Level of Function: Independent with ADLs and IADLs, college     OBJECTIVE:      Flexibility L R Comment   Hamstring  Mod restriction    Gastroc  Mod restriction    ITB  Deferred     Quad  Deferred            ROM PROM AROM Overpressure Comment    L R L R L R    Flexion 10 hyper 5 hyper        Extension 135 115                                Strength L R Comment   Quad  Iso: Good contraction    Hamstring  deferred    Gastroc  deferred    Hip  flexion  deferred    Hip abd  deferred                    Special Test Results/Comment   Meniscal Click    Crepitus    Flexion Test    Valgus Laxity    Varus Laxity    Lachmans    Drop Back    Homans -           Girth L R   Mid Patella 34 cm 35 cm   Suprapatellar     5cm above     15cm above       Reflexes/Sensation:    []Dermatomes/Myotomes intact deferred   []Reflexes equal and normal bilaterally deferred    []Other:    Joint mobility: deferred   []Normal    []Hypo   []Hyper    Palpation: ttp around incisions    Functional Mobility/Transfers:all upright and mobility tranfers impaired d/t functional outcome. [x] EVAL (LOW) 26810 (typically 20 minutes face-to-face)  [] EVAL (MOD) 90134 (typically 30 minutes face-to-face)  [] EVAL (HIGH) 88571 (typically 45 minutes face-to-face)  [] RE-EVAL       PLAN  Frequency/Duration:  1-2 days per week for 4-6 Weeks:  Interventions:  [x]  Therapeutic exercise including: strength training, ROM, for Lower extremity and core   [x]  NMR activation and proprioception for LE, Glutes and Core   [x]  Manual therapy as indicated for LE, Hip and spine to include: Dry Needling/IASTM, STM, PROM, Gr I-IV mobilizations, manipulation. [x] Modalities as needed that may include: thermal agents, E-stim, Biofeedback, US, iontophoresis as indicated  [x] Patient education on joint protection, postural re-education, activity modification, progression of HEP. HEP instruction: (see scanned forms)    GOALS:  Patient stated goal: soccer    Therapist goals for Patient:   Short Term Goals: To be achieved in: 2 weeks  1. Independent in HEP and progression per patient tolerance, in order to prevent re-injury. 2. Patient will have a decrease in pain to facilitate improvement in movement, function, and ADLs as indicated by Functional Deficits. Long Term Goals: To be achieved in:  4-6 weeks  1. Disability index score of 0% or less for the LEFS to assist with reaching prior level of function. 2. Patient will demonstrate increased AROM to 10 hyper to 135d to allow for proper joint functioning as indicated by patients Functional Deficits. 3. Patient will demonstrate an increase in Strength to 5/5 proximal hip strength and control in LE to allow for proper functional mobility as indicated by patients Functional Deficits. 4. Patient will return to  functional activities without increased symptoms or restriction.    5. Playing soccer     Electronically signed by:  Russ Landau, PT

## 2018-09-21 NOTE — FLOWSHEET NOTE
The [facility] and [facility], [address] [place] [address] [address] [facility], [address] [place] [address]  Phone: [phone]   Fax:     [phone]    Physical Therapy Daily Treatment Note  Date:  2018    Patient Name:  Vielka Olivia    :  1995  MRN: [id number]  Restrictions/Precautions:    Medical/Treatment Diagnosis Information:  Diagnosis: D35.015V (ICD-10-CM) - Tear of medial meniscus of right knee, current, unspecified tear type, subsequent encounter Z98.890 (ICD-10-CM) - S/P medial meniscectomy of right knee M65.9 (ICD-10-CM) - Synovitis  Treatment Diagnosis: L knee pain, impaired ROM, impaired strength, impaired gait  Insurance/Certification information:  PT Insurance Information: [facility] [place] - 40 visits per year  Physician Information:  Referring Practitioner: Shantanu Rosenthal  Plan of care signed (Y/N):     Date of Patient follow up with Physician:     G-Code (if applicable):      Date G-Code Applied:    PT G-Codes  Functional Assessment Tool Used: LEFS  Score: 33.75% impaired  Functional Limitation: Mobility: Walking and moving around  Mobility: Walking and Moving Around Current Status (): At least 20 percent but less than 40 percent impaired, limited or restricted  Mobility: Walking and Moving Around Goal Status (): 0 percent impaired, limited or restricted    Progress Note: [x]  Yes  []  No  Next due by: Visit #10       Latex Allergy:  [x]NO      []YES  Preferred Language for Healthcare:   [x]English       []other:    Visit # Insurance Allowable   1 40     Pain level: 0/10     SUBJECTIVE:  See eval    OBJECTIVE: See eval  Observation:   Test measurements:      RESTRICTIONS/PRECAUTIONS: WBAT    Exercises/Interventions:   Flexibility/Mobility     Ankle Pumps x30    Seated Self Extension Mob     Calf Stretch 1. Open Chain - 5x30\"  2. Incline Board -  3. Wall -     Heel Slides 10x10\"    Quad Stretch 1. Prone w/ Belt -   2. Standing -   3.  EOT - Hamstring - EOT 5x30\"    Supine Hang     Exercise/NMR     Supine Quad Sets 15x5\"    Seated Quad Set (Bug Squish)     SLRs 1. Flexion - 3x10  2. ABd - 3x10  3. ADd - 3x10  4. Ext -   5. SLR Hold -     Clamshells     Bridges 1. DL -   2. [address] -   3. SL Eccentric -   4. SL -     Knee Ext - EOT     Knee Flex - Standing     Wall Sits     Step Ups 1. Fwd -   2. Lat -     Lateral Step Downs     Lateral Toe Taps     Resisted Lateral Walking     Squat     Lunges 1. Fwd -  2. Diagonal -   3. Lateral -     Ham Curls on Stability Ball     SL RDL     Balance - Tandem 1. EO, ground -   2. EC, ground -   3. EO, airex -   4. EC, airex -     Balance - SLS 1. EO, ground -   2. EC, ground -   3. EO, airex -   4. EC, airex -     Leg Press 1. DL -   2. SL -     Quantum Quad Ext 1. DL -   2. SL -     Quantum Ham Curl 1. DL -   2. SL -     Manual Treatment     Patellar Mob 1. Sup -   2. Inf -   3. Medial -     Knee Ext Mob     Tibial IR      Flex w/ Tibial IR                          Therapeutic Exercise and NMR EXR  [x] (16119) Provided verbal/tactile cueing for activities related to strengthening, flexibility, endurance, ROM for improvements in LE, proximal hip, and core control with self care, mobility, lifting, ambulation.  [] (48998) Provided verbal/tactile cueing for activities related to improving balance, coordination, kinesthetic sense, posture, motor skill, proprioception  to assist with LE, proximal hip, and core control in self care, mobility, lifting, ambulation and eccentric single leg control.      NMR and Therapeutic Activities:    [] (69344 or 52073) Provided verbal/tactile cueing for activities related to improving balance, coordination, kinesthetic sense, posture, motor skill, proprioception and motor activation to allow for proper function of core, proximal hip and LE with self care and ADLs  [] (77178) Gait Re-education- Provided training and instruction to the patient for proper LE, core and proximal hip

## 2018-10-02 ENCOUNTER — OFFICE VISIT (OUTPATIENT)
Dept: ORTHOPEDIC SURGERY | Age: 23
End: 2018-10-02

## 2018-10-02 VITALS
DIASTOLIC BLOOD PRESSURE: 76 MMHG | SYSTOLIC BLOOD PRESSURE: 119 MMHG | WEIGHT: 145 LBS | HEIGHT: 64 IN | BODY MASS INDEX: 24.75 KG/M2 | HEART RATE: 70 BPM

## 2018-10-02 DIAGNOSIS — Z98.890 S/P MEDIAL MENISCECTOMY OF RIGHT KNEE: Primary | ICD-10-CM

## 2018-10-02 PROCEDURE — 99024 POSTOP FOLLOW-UP VISIT: CPT | Performed by: ORTHOPAEDIC SURGERY

## 2018-10-02 NOTE — PROGRESS NOTES
Chief Complaint  Follow-up (right knee. s/p 12 days medial meniscectomy. doing well )      History of Present Illness:  Marie Narvaez is a pleasant 25 y.o. female 12 days status post right knee medial menisectomy, patellar chondroplasty, and synovectomy on 9/20/2018. She continue to do well without concerns. Her trainers are telling her she's ready to go back to soccer. She reports that she's back to about 1/2 of her normal activities with significant swelling and pain. On Saturday she did non contact practice. Medical History:  Patient's medications, allergies, past medical, surgical, social and family histories were reviewed and updated as appropriate. Pertinent items are noted in HPI  Review of systems reviewed from Patient History Form dated on 10/2/2018 and available in the patient's chart under the Media tab. Vital Signs:  Vitals:    10/02/18 1537   BP: 119/76   Pulse: 70         Neuro: Alert & oriented x 3,  normal,  no focal deficits noted. Normal affect. Eyes: sclera clear  Ears: Normal external ear  Mouth:  No perioral lesions  Pulm: Respirations unlabored and regular  Pulse: Regular rate and rhythm   Skin: Warm, well perfused      Constitutional: In no apparent distress. Normal affect. Alert and oriented X3 and is cooperative. RIGHT Knee Examination:    Gait: No use of assistive devices. No antalgic gait. Alignment: Alignment appreciated. Inspection/skin: Surgical incisions healing well. No indication of infection. No dehiscence. No diffuse erythema. Quadriceps well developed. Skin is intact without erythema or ecchymosis. No gross deformity. Palpation: No crepitus. Nontender along joint line. No pain with compression of patella. Nontender to light touch. Range of Motion: Full ROM. Strength: 5/5 quad strength    Effusion: No apparent effusion. Ligamentous stability: No gross instability. Patella tracking: Smooth translation of patella.      Special tests:

## 2018-10-05 NOTE — PROGRESS NOTES
Patella apprehension sign negative. Neurologic and vascular: Skin is warm and well-perfused. Distally neurovascularly intact. Additional findings: Calf soft nontender. Sensation is intact to light-touch. No pretibial edema. Comparison LEFT Knee Examination:    Gait: No use of assistive devices. No antalgic gait. Alignment: Alignment appreciated. Inspection/skin: Quadriceps well developed. Skin is intact without erythema or ecchymosis. No gross deformity. Palpation: No crepitus. Nontender along joint line. No pain with compression of patella. Nontender to light touch. Range of Motion: Full ROM. Strength: 5/5 quad strength    Effusion: No apparent effusion. Ligamentous stability: No gross instability. Patella tracking: Smooth translation of patella. Special tests: Patella apprehension sign negative. Neurologic and vascular: Skin is warm and well-perfused. Distally neurovascularly intact. Additional findings: Calf soft nontender. Sensation is intact to light-touch. No pretibial edema. Radiology:     No new imaging obtained today. Assessment : 21yo female 12 days status post right knee medial menisectomy, patellar chondroplasty, and synovectomy on 9/20/2018. Impression:  Encounter Diagnosis   Name Primary?  S/P medial meniscectomy of right knee Yes       Office Procedures:  No orders of the defined types were placed in this encounter. Plan: Continue activities as tolerated and using her brace with activity. Pain and swelling will be her guide. Followup as needed. Steffi Mcdonough is in agreement with this plan. All questions were answered to patient's satisfaction and was encouraged to call with any further questions. Meeta [address], PA-C  10/5/2018       During this examination, I, Meeta [address], PA-C, functioned as a scribe for Dr. Alesia Farnsworth. The history taking and physical examination were performed by Dr. Alesia Farnsworth.   All counseling during the appointment was performed between the patient and Dr. Neo Blackburn. 10/5/2018 8:59 AM      This dictation was performed with a verbal recognition program (DRAGON) and it was checked for errors. It is possible that there are still dictated errors within this office note. If so, please bring any errors to my attention for an addendum. All efforts were made to ensure that this office note is accurate. Greater than 50% of the visit was spent counseling the patient. I personally reviewed the patient's pain scale, review of systems, family history, social history, past medical history, allergies and medications. 13 point review of systems was collected and reviewed today. It is noncontributory. I personally performed the services described in this documentation and scribed by [address] [place] [address] LUCIE. David Larios MD  Sports Medicine, Arthroscopic Knee and Shoulder Surgery    This dictation was performed with a verbal recognition program [facility]) and it was checked for errors. It is possible that there are still dictated errors within this office note. If so, please bring any errors to my attention for an addendum. All efforts were made to ensure that this office note is accurate.

## 2021-07-30 ENCOUNTER — OFFICE VISIT (OUTPATIENT)
Dept: ORTHOPEDIC SURGERY | Age: 26
End: 2021-07-30
Payer: COMMERCIAL

## 2021-07-30 VITALS — BODY MASS INDEX: 23.05 KG/M2 | WEIGHT: 135 LBS | TEMPERATURE: 97.1 F | HEIGHT: 64 IN

## 2021-07-30 DIAGNOSIS — M25.561 RIGHT KNEE PAIN, UNSPECIFIED CHRONICITY: Primary | ICD-10-CM

## 2021-07-30 DIAGNOSIS — M22.41 CHONDROMALACIA OF PATELLOFEMORAL JOINT, RIGHT: ICD-10-CM

## 2021-07-30 PROCEDURE — 99214 OFFICE O/P EST MOD 30 MIN: CPT | Performed by: ORTHOPAEDIC SURGERY

## 2021-07-30 NOTE — PROGRESS NOTES
Date:  2021    Name:  Jeanette Cullen  Address:  [address] [place] [address] [place] [address]    :  1995      Age:   26 y.o.    SSN:        Medical Record Number:  <[id number]>    Reason for Visit:    Chief Complaint    Joint Pain (old patient new problem increase right knee pain)      DOS:2021     HPI: Jeanette Cullen is a 26 y.o. female here today for evaluation of right knee pain that has been ongoing for a couple weeks. She does not recall an associated injury but developed pain while on a walk at the beginning of July. She complains of anterior pressure and medial sided pain exacerbated with exercise. Endorses cracking and popping. Denies catching or locking. She has a history of right knee medial menisectomy and ACL reconstruction. She has not had any formal treatment for this current issue. Pain Assessment  Location of Pain: Knee  Location Modifiers: Right  Severity of Pain: 2  Quality of Pain: Sharp, Aching  Frequency of Pain: Intermittent  Aggravating Factors: Exercise  Limiting Behavior: Some  Result of Injury: Yes  Work-Related Injury: No  Are there other pain locations you wish to document?: No  ROS: Review of systems reviewed from Patient History Form completed today and available in the patient's chart under the Media tab. Past Medical History:   Diagnosis Date    PONV (postoperative nausea and vomiting)     Synovitis 2018        Past Surgical History:   Procedure Laterality Date    ANTERIOR CRUCIATE LIGAMENT REPAIR Right 2015    ARM SURGERY      KNEE ARTHROSCOPY Right 2016    right knee scope, medial meniscus repair and synovectomy    KNEE ARTHROSCOPY  2015    acl repair     SC ARTHRS KNE SURG W/MENISCECTOMY MED/LAT W/SHVG Right 2018    RIGHT KNEE ARTHROSCOPY, MEDIAL MENISCUS EXCISION, SYNOVECTOMY performed by Jonnathan Sun MD at [facility] reviewed. No pertinent family history.     Social History Socioeconomic History    Marital status: Single     Spouse name: None    Number of children: None    Years of education: None    Highest education level: None   Occupational History    Occupation:    Tobacco Use    Smoking status: Never Smoker    Smokeless tobacco: Never Used   Substance and Sexual Activity    Alcohol use: Yes     Alcohol/week: 0.0 standard drinks     Comment: occasional    Drug use: No    Sexual activity: None   Other Topics Concern    None   Social History Narrative    None     Social Determinants of Health     Financial Resource Strain:     Difficulty of Paying Living Expenses:    Food Insecurity:     Worried About Running Out of Food in the Last Year:     Ran Out of Food in the Last Year:    Transportation Needs:     Lack of Transportation (Medical):  Lack of Transportation (Non-Medical):    Physical Activity:     Days of Exercise per Week:     Minutes of Exercise per Session:    Stress:     Feeling of Stress :    Social Connections:     Frequency of Communication with Friends and Family:     Frequency of Social Gatherings with Friends and Family:     Attends Judaism Services:     Active Member of Clubs or Organizations:     Attends Club or Organization Meetings:     Marital Status:    Intimate Partner Violence:     Fear of Current or Ex-Partner:     Emotionally Abused:     Physically Abused:     Sexually Abused:        Current Outpatient Medications   Medication Sig Dispense Refill    senna-docusate (PERICOLACE) 8.6-50 MG per tablet Take 2 tablets by mouth daily as needed for Constipation 30 tablet 0    drospirenone-ethinyl estradiol (LORYNA) 3-0.02 MG per tablet Take 1 tablet by mouth daily      azithromycin (ZITHROMAX) 250 MG tablet       oxyCODONE-acetaminophen (PERCOCET) 5-325 MG per tablet        No current facility-administered medications for this visit.        No Known Allergies    Vital signs:  Temp 97.1 °F (36.2 °C)   Ht 5' 4\" (1.626 m)   Wt 135 lb (61.2 kg)   BMI 23.17 kg/m²          RIGHT knee exam    Gait: No use of assistive devices. No antalgic gait. Alignment: normal alignment. Inspection/skin: Skin is intact without erythema or ecchymosis. No gross deformity. Palpation: Pain with compression of the patella. Lateral retinacular tightness and tenderness to palpation. Mild retropatellar crepitus. Mild medial joint line tenderness. Tenderness is present over medial parapatellar plica. no joint line tenderness present. Range of Motion: There is full range of motion. Hamstring and IT band tightness. Strength: Normal quadriceps development. Effusion: Small effusion. No bursal swelling. Ligamentous stability: No cruciate or collateral ligament instability. Neurologic and vascular: Skin is warm and well-perfused. Sensation is intact to light-touch. Distally neurovascularly intact. Special tests: Patella apprehension sign is negative. Negative Asuncion sign. LEFT Knee Exam:    Gait: No use of assistive devices. No antalgic gait. Alignment: Normal alignment. Inspection/skin: Skin is intact without erythema or ecchymosis. No gross deformity. Palpation: No crepitus. No joint line tenderness present. Range of Motion: There is full range of motion. Strength: Normal quadriceps development. Effusion: No effusion or swelling present. Ligamentous stability: No cruciate or collateral ligament instability. Neurologic and vascular: Skin is warm and well-perfused. Sensation is intact to light-touch. Special tests: Negative Asuncion sign. Diagnostics:  Radiology:     Pertinent imaging was interpreted and reviewed with the patient. Radiographs were obtained, interpreted, and reviewed with the patient in the office; 4 views: bilateral PA, bilateral To[place] Abby, bilateral Merchants AND right lateral    Impression: Mild patellofemoral and medial joint space narrowing.

## (undated) DEVICE — GLOVE SURG SZ 9 L1185IN FNGR THK75MIL STRW LTX POLYMER BEAD

## (undated) DEVICE — SOLUTION IRRIG 3000ML LAC R FLX CONT

## (undated) DEVICE — PACK PROCEDURE SURG ARTHROSCOPY

## (undated) DEVICE — PAD DRY FLOOR ABS 32X58IN GRN

## (undated) DEVICE — TUBING PMP L6FT CONT WAVE EXTN

## (undated) DEVICE — GOWN,SIRUS,POLYRNF,SETINSLV,XL,20/CS: Brand: MEDLINE

## (undated) DEVICE — COVER,MAYO STAND,XL,STERILE: Brand: MEDLINE

## (undated) DEVICE — 3M™ STERI-DRAPE™ U-DRAPE 1015: Brand: STERI-DRAPE™

## (undated) DEVICE — SKIN AFFIX SURG ADHESIVE 72/CS 0.55ML: Brand: MEDLINE

## (undated) DEVICE — BLADE SHV L13CM DIA4MM EXCALIBUR AGG COOLCUT

## (undated) DEVICE — TURNOVER KIT RM INF CTRL TECH

## (undated) DEVICE — GOWN,SIRUS,POLYRNF,BRTHSLV,XLN/XXL,18/CS: Brand: MEDLINE

## (undated) DEVICE — COVER LT HNDL BLU PLAS

## (undated) DEVICE — PAD,ABDOMINAL,5"X9",ST,LF,25/BX: Brand: MEDLINE INDUSTRIES, INC.

## (undated) DEVICE — STERILE POLYISOPRENE POWDER-FREE SURGICAL GLOVES WITH EMOLLIENT COATING: Brand: PROTEXIS

## (undated) DEVICE — ELECTRODE PT RET AD L9FT HI MOIST COND ADH HYDRGEL CORDED

## (undated) DEVICE — TUBING PMP L16FT MAIN DISP FOR AR-6400 AR-6475

## (undated) DEVICE — PENCIL ES L3M ROCK SWCH S STL HEX LOK BLDE ELECTRD HOLSTER

## (undated) DEVICE — BLADE SHV L13CM DIA4MM TAPR TIP SCIS LIKE CUT OVL OUTER

## (undated) DEVICE — CHLORAPREP 26ML ORANGE

## (undated) DEVICE — CANNULA NSL AD TBNG L7FT PVC STR NONFLARED PRNG O2 DEL W STD

## (undated) DEVICE — SUTURE MCRYL SZ 4-0 L27IN ABSRB UD L19MM PS-2 1/2 CIR PRIM Y426H

## (undated) DEVICE — SURE SET-DOUBLE BASIN-LF: Brand: MEDLINE INDUSTRIES, INC.

## (undated) DEVICE — PAD,NON-ADHERENT,3X8,STERILE,LF,1/PK: Brand: MEDLINE

## (undated) DEVICE — TUBING FLD MGMT Y DBL SPIK DUALWAVE

## (undated) DEVICE — INTENDED FOR TISSUE SEPARATION, AND OTHER PROCEDURES THAT REQUIRE A SHARP SURGICAL BLADE TO PUNCTURE OR CUT.: Brand: BARD-PARKER ® CARBON RIB-BACK BLADES